# Patient Record
Sex: MALE | Race: WHITE | NOT HISPANIC OR LATINO | Employment: OTHER | URBAN - METROPOLITAN AREA
[De-identification: names, ages, dates, MRNs, and addresses within clinical notes are randomized per-mention and may not be internally consistent; named-entity substitution may affect disease eponyms.]

---

## 2018-09-14 ENCOUNTER — HISTORICAL (OUTPATIENT)
Dept: ADMINISTRATIVE | Facility: HOSPITAL | Age: 73
End: 2018-09-14

## 2018-09-14 LAB
ABS NEUT (OLG): 0.58 X10(3)/MCL (ref 2.1–9.2)
CRP SERPL HS-MCNC: 7.66 MG/L (ref 0–3)
ERYTHROCYTE [DISTWIDTH] IN BLOOD BY AUTOMATED COUNT: 14.9 % (ref 11.5–17)
ERYTHROCYTE [SEDIMENTATION RATE] IN BLOOD: 38 MM/HR (ref 0–15)
HCT VFR BLD AUTO: 33.8 % (ref 42–52)
HGB BLD-MCNC: 11.3 GM/DL (ref 14–18)
MCH RBC QN AUTO: 34.5 PG (ref 27–31)
MCHC RBC AUTO-ENTMCNC: 33.4 GM/DL (ref 33–36)
MCV RBC AUTO: 103 FL (ref 80–94)
PLATELET # BLD AUTO: 148 X10(3)/MCL (ref 130–400)
PMV BLD AUTO: 10.5 FL (ref 9.4–12.4)
RBC # BLD AUTO: 3.28 X10(6)/MCL (ref 4.7–6.1)
WBC # SPEC AUTO: 2 X10(3)/MCL (ref 4.5–11.5)

## 2018-10-12 ENCOUNTER — INITIAL CONSULT (OUTPATIENT)
Dept: OPHTHALMOLOGY | Facility: CLINIC | Age: 73
End: 2018-10-12
Payer: MEDICARE

## 2018-10-12 ENCOUNTER — CLINICAL SUPPORT (OUTPATIENT)
Dept: OPHTHALMOLOGY | Facility: CLINIC | Age: 73
End: 2018-10-12
Payer: MEDICARE

## 2018-10-12 DIAGNOSIS — H47.11 PAPILLEDEMA ASSOCIATED WITH INCREASED INTRACRANIAL PRESSURE: ICD-10-CM

## 2018-10-12 DIAGNOSIS — H53.433 ARCUATE SCOTOMA OF BOTH EYES: ICD-10-CM

## 2018-10-12 PROCEDURE — 92083 EXTENDED VISUAL FIELD XM: CPT | Mod: PBBFAC | Performed by: OPHTHALMOLOGY

## 2018-10-12 PROCEDURE — 99999 PR PBB SHADOW E&M-NEW PATIENT-LVL III: CPT | Mod: PBBFAC,,, | Performed by: OPHTHALMOLOGY

## 2018-10-12 PROCEDURE — 92004 COMPRE OPH EXAM NEW PT 1/>: CPT | Mod: S$PBB,,, | Performed by: OPHTHALMOLOGY

## 2018-10-12 PROCEDURE — 99203 OFFICE O/P NEW LOW 30 MIN: CPT | Mod: PBBFAC,25 | Performed by: OPHTHALMOLOGY

## 2018-10-12 RX ORDER — TEMAZEPAM 15 MG/1
CAPSULE ORAL
Refills: 0 | COMMUNITY
Start: 2018-09-24

## 2018-10-12 RX ORDER — FLUCONAZOLE 200 MG/1
TABLET ORAL
COMMUNITY
Start: 2018-09-26

## 2018-10-12 RX ORDER — ASPIRIN 81 MG/1
81 TABLET ORAL DAILY
COMMUNITY

## 2018-10-12 RX ORDER — BENAZEPRIL HYDROCHLORIDE 20 MG/1
TABLET ORAL
COMMUNITY
Start: 2018-07-13

## 2018-10-12 RX ORDER — LENALIDOMIDE 25 MG/1
CAPSULE ORAL
COMMUNITY
Start: 2018-08-14

## 2018-10-12 RX ORDER — ACETAZOLAMIDE 500 MG/1
500 CAPSULE, EXTENDED RELEASE ORAL 2 TIMES DAILY
Qty: 60 CAPSULE | Refills: 5 | Status: SHIPPED | OUTPATIENT
Start: 2018-10-12

## 2018-10-12 RX ORDER — AMLODIPINE BESYLATE 5 MG/1
TABLET ORAL
COMMUNITY
Start: 2018-07-13

## 2018-10-12 RX ORDER — VALACYCLOVIR HYDROCHLORIDE 500 MG/1
TABLET, FILM COATED ORAL
COMMUNITY
Start: 2018-09-26

## 2018-10-12 RX ORDER — SULFAMETHOXAZOLE AND TRIMETHOPRIM 800; 160 MG/1; MG/1
TABLET ORAL
COMMUNITY
Start: 2018-09-20

## 2018-10-12 NOTE — PROGRESS NOTES
HPI     Referred by Dr.Jeffrey ANDREA Quiñonez  Patient states OU seeing flashing lights in vision since finishing chemo x   4 weeks.  Dx w/multiple myeloma.  Pt notice has gotten better, but still there. Notice more when images or   farther away the bigger the lights.    No eye pain.    I have personally interviewed the patient, reviewed the history and   examined the patient and agree with the technician's exam.    I reviewed his MRI and agree that there were no mass lesions.    Last edited by Natan Lloyd MD on 10/12/2018  2:04 PM. (History)            Assessment /Plan     For exam results, see Encounter Report.    Papilledema associated with increased intracranial pressure  -     Godinez Visual Field - OU - Extended - Both Eyes    Arcuate scotoma of both eyes    Other orders  -     acetaZOLAMIDE (DIAMOX) 500 mg CpSR; Take 1 capsule (500 mg total) by mouth 2 (two) times daily.  Dispense: 60 capsule; Refill: 5      The lack of headaches, the normal temporal arteries, the low platelet count, and normal for age ESR make giant cell arteritis unlikely. Possible causes of papilledema in Mr. Puentes would include chronic anemia, neoplastic meningitis, and a CNS infection. The normal MRI rules out a mass lesion. I would recommend a lumbar puncture to measure the intracranial pressure and assess the CSF for inflammation or neoplasm. He would likely benefit with improvement in his hematocrit. I will start acetazolamide 500 mg per day to see if that helps with his vision. I will send this information to his oncologist, Dr. Mujica, to set up the lumbar puncture. He will need repeat visual field testing in one month; if Dr. Quiñonez can perform the visual field test in his office and send me copies of that along with photos of the optic discs, he can do his eye follow up with Dr. Quiñonez. If not, I will set that up here.

## 2018-10-12 NOTE — PATIENT INSTRUCTIONS
Take the capsule twice a day.  Lumbar puncture through Dr. Mujica.  Follow up with Dr. Quiñonez if possible in one month for optic disc photos and visual field testing to forward to me. Set up here if Dr. Quiñonez cannot provide the tests.

## 2018-10-12 NOTE — LETTER
[facility]  [address]  [facility] [address]  Phone: [phone]  Fax: [phone]   October 12, 2018    Cameron Quiñonez MD  [address] [place] [address]    Patient: Jamaal Puentes   MR Number: [id number]   YOB: 1945   Date of Visit: 10/12/2018       Dear Dr. Quiñonez:    Thank you for referring Jamaal Puentes to me for evaluation. Here is my assessment and plan of care:    Assessment:   /Plan     For exam results, see Encounter Report.    Papilledema associated with increased intracranial pressure  -     Godinez Visual Field - OU - Extended - Both Eyes    Arcuate scotoma of both eyes    Other orders  -     acetaZOLAMIDE (DIAMOX) 500 mg CpSR; Take 1 capsule (500 mg total) by mouth 2 (two) times daily.  Dispense: 60 capsule; Refill: 5      The lack of headaches, the normal temporal arteries, the low platelet count, and normal for age ESR make giant cell arteritis unlikely. Possible causes of papilledema in Mr. Puentes would include chronic anemia, neoplastic meningitis, and a CNS infection. The normal MRI rules out a mass lesion. I would recommend a lumbar puncture to measure the intracranial pressure and assess the CSF for inflammation or neoplasm. He would likely benefit with improvement in his hematocrit. I will start acetazolamide 500 mg per day to see if that helps with his vision. I will send this information to his oncologist, Dr. Mujica, to set up the lumbar puncture. He will need repeat visual field testing in one month; if Dr. Quiñonez can perform the visual field test in his office and send me copies of that along with photos of the optic discs, he can do his eye follow up with Dr. Quiñonez. If not, I will set that up here.          Plan:       For exam results, see Encounter Report.    Papilledema associated with increased intracranial pressure  -     Godinez Visual Field - OU - Extended - Both Eyes    Arcuate scotoma of both eyes    Other orders  -      acetaZOLAMIDE (DIAMOX) 500 mg CpSR; Take 1 capsule (500 mg total) by mouth 2 (two) times daily.  Dispense: 60 capsule; Refill: 5      The lack of headaches, the normal temporal arteries, the low platelet count, and normal for age ESR make giant cell arteritis unlikely. Possible causes of papilledema in Mr. Puentes would include chronic anemia, neoplastic meningitis, and a CNS infection. The normal MRI rules out a mass lesion. I would recommend a lumbar puncture to measure the intracranial pressure and assess the CSF for inflammation or neoplasm. He would likely benefit with improvement in his hematocrit. I will start acetazolamide 500 mg per day to see if that helps with his vision. I will send this information to his oncologist, Dr. Mujica, to set up the lumbar puncture. He will need repeat visual field testing in one month; if Dr. Quiñonez can perform the visual field test in his office and send me copies of that along with photos of the optic discs, he can do his eye follow up with Dr. Quiñonez. If not, I will set that up here.            Below you will find my full exam findings. If you have questions, please do not hesitate to call me. I look forward to following Mr. Jamaal Puentes along with you.    Sincerely,          Natan Lloyd MD       CC  Gustavo Mujica MD             Base Eye Exam     Visual Acuity (Snellen - Linear)       Right Left    Dist sc 20/40 -2 20/60    Dist ph sc NI 20/40 -1          Tonometry (Applanation, 2:09 PM)       Right Left    Pressure 13 13          Pupils       Dark Light Shape React APD    Right 3 2 Round Brisk None    Left 3 2 Round Brisk None          Visual Fields    See HVF report.           Extraocular Movement       Right Left     Full, Ortho Full, Ortho          Neuro/Psych     Oriented x3:  Yes    Mood/Affect:  Normal          Dilation     Both eyes:  2.5% Phenylephrine, 1% Mydriacyl @ 2:42 PM            Slit Lamp and Fundus Exam     External Exam       Right Left     External Normal temporal artery pulse Normal temporal artery pulse          Slit Lamp Exam       Right Left    Lids/Lashes Normal Normal    Conjunctiva/Sclera White and quiet White and quiet    Cornea Clear Clear    Anterior Chamber Deep and quiet Deep and quiet    Iris Round and reactive Round and reactive    Lens 2+ Nuclear sclerosis 2+ Nuclear sclerosis    Vitreous  Normal          Fundus Exam       Right Left    Disc 3+ Optic disc edema 3+ Optic disc edema    C/D Ratio 0.1 0.1    Macula Normal Normal    Vessels Tortuous Tortuous    Periphery Normal Normal

## 2018-11-05 ENCOUNTER — TELEPHONE (OUTPATIENT)
Dept: OPHTHALMOLOGY | Facility: CLINIC | Age: 73
End: 2018-11-05

## 2018-11-05 DIAGNOSIS — H53.433 ARCUATE SCOTOMA OF BOTH EYES: ICD-10-CM

## 2018-11-05 DIAGNOSIS — H47.11 PAPILLEDEMA ASSOCIATED WITH INCREASED INTRACRANIAL PRESSURE: Primary | ICD-10-CM

## 2018-11-05 NOTE — TELEPHONE ENCOUNTER
----- Message from Sharmaine Marcos sent at 11/5/2018 12:50 PM CST -----  Contact: Kendra (spouse)  Patient Returning Call from [facility]    Who Left Message for Patient: Dr. Lloyd    Communication Preference: Call back [phone]    Additional Information: Pt spouse returning your phone call with regard to her husbands results from the lumbar puncture.  She apologized for missing your call today but would like for you to call her back when possible.

## 2018-11-05 NOTE — TELEPHONE ENCOUNTER
Attempted to call Mr. Puentes's wife regarding lumbar puncture. Went to voicemail. I left a message for her to return my call.

## 2018-11-05 NOTE — TELEPHONE ENCOUNTER
I spoke with Ms. Sanchez. I explained the cytospin procedure and indicated that no malignant cells had been seen on microscopic examination. Ms. Sanchez indicated that she would contact Dr. Mujica's office to assure that I received the full results of the recent LP. Mr. Puentes will be seeing an ophthalmologist in [place] in a few weeks and she will have him send me the results of the eye exam and visual field testing.

## 2018-11-05 NOTE — TELEPHONE ENCOUNTER
----- Message from Geena Gillespie MA sent at 10/31/2018  4:06 PM CDT -----  Contact: Kendra (wife)       ----- Message -----  From: Terri Rodriguez  Sent: 10/31/2018   3:57 PM  To: [facility] Staff    Pt wife wanted to let the office know that the pt spinal tap was done on Monday. Pt wife can be reached at [phone].

## 2018-11-07 ENCOUNTER — TELEPHONE (OUTPATIENT)
Dept: OPHTHALMOLOGY | Facility: CLINIC | Age: 73
End: 2018-11-07

## 2018-11-16 ENCOUNTER — TELEPHONE (OUTPATIENT)
Dept: OPHTHALMOLOGY | Facility: CLINIC | Age: 73
End: 2018-11-16

## 2018-11-16 NOTE — TELEPHONE ENCOUNTER
----- Message from Terri Rodriguez sent at 11/16/2018 10:44 AM CST -----  Contact: Suri ([facility])  Suri from [facility] was calling to get pt office notes sent over to their office. Suri can be reached at [phone]. [facility] Fax [phone].

## 2018-12-04 ENCOUNTER — TELEPHONE (OUTPATIENT)
Dept: OPHTHALMOLOGY | Facility: CLINIC | Age: 73
End: 2018-12-04

## 2018-12-04 DIAGNOSIS — H53.433 ARCUATE SCOTOMA OF BOTH EYES: ICD-10-CM

## 2018-12-04 DIAGNOSIS — H47.11 PAPILLEDEMA ASSOCIATED WITH INCREASED INTRACRANIAL PRESSURE: Primary | ICD-10-CM

## 2018-12-04 NOTE — TELEPHONE ENCOUNTER
I reviewed his HVF from November. He shows evidence of improvement of his peripheral vision. Examination at the [facility] in [place] showed decreased optic disc edema. I spoke with Kendra, his wife, regarding maintaining acetazolamide dosage at 500 mg daily rather than 1000 mg daily. I agreed that it would be safe for him to take omega 3 fatty acids to help keep his eyes moist. He should have a repeat visual field test in about a month. She indicated understanding.

## 2018-12-17 ENCOUNTER — TELEPHONE (OUTPATIENT)
Dept: OPHTHALMOLOGY | Facility: CLINIC | Age: 73
End: 2018-12-17

## 2018-12-17 NOTE — TELEPHONE ENCOUNTER
----- Message from Sharmaine Hemant sent at 12/17/2018 10:25 AM CST -----  Contact: Kendra (wife)  Pt wife calling with regard to pt having to have VF test done every month per Dr. Lloyd.  Review of his chart did not confirm he wanted the test done every month just that he was to have it done in one month and the information was to be forwarded to him.  She states she spoke to him a couple of weeks ago and he stated it needed to be done monthly.  There is no order for it.  Please contact her at [phone].

## 2019-12-31 ENCOUNTER — HISTORICAL (OUTPATIENT)
Dept: ADMINISTRATIVE | Facility: HOSPITAL | Age: 74
End: 2019-12-31

## 2020-01-20 ENCOUNTER — HISTORICAL (OUTPATIENT)
Dept: ADMINISTRATIVE | Facility: HOSPITAL | Age: 75
End: 2020-01-20

## 2020-02-06 ENCOUNTER — HISTORICAL (OUTPATIENT)
Dept: ADMINISTRATIVE | Facility: HOSPITAL | Age: 75
End: 2020-02-06

## 2020-03-12 ENCOUNTER — HISTORICAL (OUTPATIENT)
Dept: ADMINISTRATIVE | Facility: HOSPITAL | Age: 75
End: 2020-03-12

## 2021-09-28 ENCOUNTER — HISTORICAL (OUTPATIENT)
Dept: ADMINISTRATIVE | Facility: HOSPITAL | Age: 76
End: 2021-09-28

## 2021-09-28 LAB — SARS-COV-2 RNA RESP QL NAA+PROBE: NOT DETECTED

## 2022-04-10 ENCOUNTER — HISTORICAL (OUTPATIENT)
Dept: ADMINISTRATIVE | Facility: HOSPITAL | Age: 77
End: 2022-04-10
Payer: MEDICARE

## 2022-04-28 VITALS
SYSTOLIC BLOOD PRESSURE: 147 MMHG | WEIGHT: 172 LBS | DIASTOLIC BLOOD PRESSURE: 74 MMHG | BODY MASS INDEX: 25.48 KG/M2 | HEIGHT: 69 IN | OXYGEN SATURATION: 97 %

## 2022-05-03 NOTE — HISTORICAL [facility] CERNER
This is a historical note converted from Joshua. Formatting and pictures may have been removed.  Please reference Joshua for original formatting and attached multimedia. Procedure Name  Bilateral L2-5 medial branch blocks  ?   Pre-Procedure Diagnoses:  1. Chronic pain syndrome  2. Low back pain  3. Lumbar facet arthropathy  ?   Post-Procedure Diagnoses:  1. Chronic pain syndrome  2. Low back pain  3. Lumbar facet arthropathy  ?   Anesthesia:  Local and MAC  ?  Estimated Blood Loss:  None  ?  Complications:  None  ?  Informed Consent:  The procedure, risks, benefits, and alternatives were discussed with the patient.? There were no contraindications to the procedure.? The patient expressed understanding and agreed to proceed.? Fully informed written consent was obtained.?  ?  Description of the Procedure:  The patient was taken to the operating room.? IV access was obtained prior to the start of the procedure.? The patient was positioned prone on the fluoroscopy table.? Continuous hemodynamic monitoring was initiated and continued throughout the duration of the procedure.? The skin overlying the lumbosacral spine was prepped with Betadine and draped into a sterile field.? Fluoroscopy was used to identify the locations of the L2, L3, L4, and L5 medial branch nerves at the junctions of the superior articular processes and transverse processes of L3, L4, L5, and the sacral ala, respectively.? Skin anesthesia was achieved using?0.5 mL of 1% lidocaine over each injection site.? A 22-gauge 3.5-inch Quinke spinal needle was slowly inserted and advanced under intermittent fluoroscopic guidance until it made bony contact at the target sites.? Proper needle position was confirmed under AP, oblique, and lateral fluoroscopic views.? Negative aspiration for blood or CSF was confirmed.? Then a combination of?5 mg of Kenalog and?0.5 mL of 0.5% bupivacaine was easily injected at each level.? There was no pain on injection.? The  needles were removed intact and bleeding was nil.? The same procedure was repeated in identical fashion on the?right side. Sterile bandages were applied.? The patient was taken to the recovery room for further observation in stable condition.? The patient was then discharged home without any complications.

## 2022-05-03 NOTE — HISTORICAL [facility] CERNER
This is a historical note converted from Joshua. Formatting and pictures may have been removed.  Please reference Joshua for original formatting and attached multimedia. Chief Complaint  pt here for right foot pain/numbness x 3 months, states pain is under the heel of the foot on the left side, states feels like walking on rocks, saw Dr. Franklin in [place], also c/o lower back pain, xrays today.....sm  History of Present Illness  Patient complains of right foot pain.  He reports if he sits for long period time and goes to get up he does have some pain over the?bottom to the medial aspect of his right heel. ?He denies any trauma?incident or other injury.  ?  Patient is reported?does have a history of multiple myeloma?and is also been having some back pain he did have a?CT or MRI done in our [place] in [place] but he does not have the images of the results?with him today. ?New line?he was told by the oncologist that are the test that he does have some?back issues with pinching of the nerves and need to see someone who took care of back problems  Review of Systems  Constitutional: No fever, No chills.  Respiratory: No shortness of breath, No cough.  Cardiovascular: No chest pain.  Gastrointestinal: No nausea, No vomiting, No diarrhea, No constipation, No heartburn.  Genitourinary: No dysuria, No hematuria.  Hematology/Lymphatics: No bleeding tendency.  Endocrine: No polyuria.  Neurologic: Alert and oriented X4, No numbness, No tingling.  Psychiatric: No anxiety, No depression.  Integumentary:? negative except as documented in history of present illness  Physical Exam  Vitals & Measurements  T:?98.1? ?F (Oral)? HR:?74(Peripheral)? BP:?124/74?  HT:?172?cm? WT:?74?kg? BMI:?25.01?  Right foot exam  Patient has good strength with dorsi and plantarflexion  Extensor hallucis?strength intact  2+ dorsalis pedis pulse of the right foot  Intact motor and sensation  Patient does have significant tenderness palpation  across the medial facet of the?right?heel  Tender to palpation across the plantar fascial origin  No edema or erythema noted  ?  X-rays taken today of the right foot  No acute bony abnormalities noted  There is a small osteophyte off the?calcaneus  ?  Diagnosis: Right foot plantar fasciitis  Plan:?Patient is educated informed that he is to wear soft shoes at all time.  He is given a handout on plantar fascial stretching and instructed on using a frozen water bottle for icing of the right heel.  ?  Patient does have issues with his lower lumbar spine and an appointment is made with Dr. Prieto to see about his lower lumbar issues.  Patient also instructed needs to obtain copies of his imaging studies that he had done in [place] is to bring to his appointment with Dr. Prieto can better evaluate him  Assessment/Plan  1.?Plantar fasciitis, right?M72.2   Problem List/Past Medical History  Ongoing  FH: Hypertension  Hyperlipemia  Myeloma  Plantar fasciitis, right  Tobacco user  Historical  No qualifying data  Procedure/Surgical History  Rotator cuff repair (2015)  Appendectomy (1959)  Repair of Intestinal Blockage (1959)   Medications  amLODIPine 5 mg oral tablet, 5 mg= 1 tab(s), Oral, Daily  benazepril 20 mg oral tablet, 20 mg= 1 tab(s), Oral, Daily  Pravastatin 40 mg Oral Tab, 40 mg= 1 tab(s), Oral, Daily,? ?Not taking: Last Dose Date/Time Unknown. done  valacyclovir 500 mg oral tablet, 500 mg= 1 tab(s), Oral, Daily  Ventolin HFA 90 mcg/inh inhalation aerosol, 2 puff(s), INH, q4hr, PRN  Allergies  sulfa drugs  Social History  Tobacco  Current some day smoker, 20 per day., 10/04/2016  Family History  Alzheimers disease: Mother.  HTN (hypertension) 27-APR-2016 23:35:49<$>: Mother.  Idiopathic pulmonary fibrosis: Father.  Health Maintenance  Health Maintenance  ???Pending?(in the next year)  ??? ??Due?  ??? ? ? ?ADL Screening due??12/31/19??and every 1??year(s)  ??? ? ? ?Abdominal Aortic Aneurysm Screening  due??12/31/19??and every 100??year(s)  ??? ? ? ?Aspirin Therapy for CVD Prevention due??12/31/19??and every 1??year(s)  ??? ? ? ?Lung Cancer Screening due??12/31/19??and every 1??year(s)  ??? ? ? ?Pneumococcal Vaccine due??12/31/19??Variable frequency  ??? ? ? ?Tetanus Vaccine due??12/31/19??and every 10??year(s)  ??? ? ? ?Zoster Vaccine due??12/31/19??and every 100??year(s)  ??? ??Due In Future?  ??? ? ? ?Advance Directive not due until??01/01/20??and every 1??year(s)  ??? ? ? ?Alcohol Misuse Screening not due until??01/01/20??and every 1??year(s)  ??? ? ? ?Cognitive Screening not due until??01/01/20??and every 1??year(s)  ??? ? ? ?Fall Risk Assessment not due until??01/01/20??and every 1??year(s)  ??? ? ? ?Functional Assessment not due until??01/01/20??and every 1??year(s)  ??? ? ? ?Geriatric Depression Screening not due until??01/01/20??and every 1??year(s)  ??? ? ? ?Obesity Screening not due until??01/01/20??and every 1??year(s)  ??? ? ? ?Smoking Cessation not due until??01/01/20??and every 1??year(s)  ???Satisfied?(in the past 1 year)  ??? ??Satisfied?  ??? ? ? ?Blood Pressure Screening on??12/31/19.??Satisfied by Colleen Butterfield LPN  ??? ? ? ?Body Mass Index Check on??12/31/19.??Satisfied by Colleen Butterfield LPN  ??? ? ? ?Hypertension Management-Blood Pressure on??12/31/19.??Satisfied by Colleen Butterfield LPN  ??? ? ? ?Obesity Screening on??12/31/19.??Satisfied by Colleen Butterfield LPN  ?

## 2022-05-03 NOTE — HISTORICAL [facility] CERNER
This is a historical note converted from Joshua. Formatting and pictures may have been removed.  Please reference Joshua for original formatting and attached multimedia. Procedure Name  Bilateral L4?Transforaminal Epidural Steroid Injections  ?   Pre-Procedure Diagnoses:  1. Chronic pain syndrome  2. Low back pain  3. Lumbar radiculopathy  4. Lumbar disc displacement  5. Lumbar degenerative disc disease  ?   Post-Procedure Diagnoses:  1. Chronic pain syndrome  2. Low back pain  3. Lumbar radiculopathy  4. Lumbar disc displacement  5. Lumbar degenerative disc disease  ?   Anesthesia:  Local and MAC  ?  Estimated Blood Loss:  None  ?  Complications:  None  ?  Informed Consent:  The procedure, risks, benefits, and alternatives were discussed with the patient.? There were no contraindications to the procedure.? The patient expressed understanding and agreed to proceed.? Fully informed written consent was obtained.?  ?  Description of the Procedure:  The patient was taken to the operating room.? IV access was obtained prior to the start of the procedure.? The patient was positioned prone on the fluoroscopy table.? Continuous hemodynamic monitoring was initiated and continued throughout the duration of the procedure.? The skin overlying the lumbosacral spine was prepped with Betadine and draped into a sterile field.? An oblique fluoroscopic view was obtained on the left side at?L4, with the superior articular process of the inferior vertebral body aligned with the pedicle.? Skin anesthesia was achieved using 1 mL of 1% lidocaine.? A 22-gauge 3.5-inch Quinke spinal needle was slowly inserted and advanced towards the 6 oclock position of the pedicle and into the epidural space.? Proper needle position was confirmed under AP, oblique, and lateral fluoroscopic views.? Negative aspiration for blood or CSF was confirmed.? 0.5 mL of Isovue contrast was injected.? Fluoroscopic imaging revealed a clear outline of the spinal nerve  with proximal spread of agent through the neural foramen and into the epidural space.? Then a combination of 5 mg of dexamethasone and 1 mL of 0.5% bupivacaine was easily injected.? There was no pain on injection.? The needle was removed intact and bleeding was nil.? The same procedure was repeated in identical fashion on the?right side at L4. Sterile bandages were applied.? The patient was taken to the recovery room for further observation in stable condition.? The patient was then discharged home without any complications.

## 2022-05-03 NOTE — HISTORICAL [facility] CERNER
This is a historical note converted from [employer]. Formatting and pictures may have been removed.  Please reference Joshua for original formatting and attached multimedia. Chief Complaint  LOW BACK PAIN REFERRED BY ANDREA AND DR. HOWE. ?HE STATES IT HURTS WHEN HE BENDS DOWN TO  LIMBS, HE HAD SOME NUMBNESS IN HIS LEGS.  History of Present Illness  He is complaining of numbness in both lower extremities. ?He has a pertinent history in that he has been treated with?chemotherapy for multiple myeloma.? The numbness in his legs started sometimes about the treatment he remembers no?trauma?and has no real back pain. ?Is just his constant numbness or tingling in both feet?and both lower extremities. ?He has no bowel or bladder problems. ?He has no weakness in his lower extremities.? Just the history of the myeloma.? This is been going on for some time.? He did have an MRI performed?by his?current treating physician and was referred here.? He was seen by his cardiologist and?vascular status was cleared in his lower extremities.  Review of Systems  Comprehensive Review of Systems performed with no exceptions other than as noted in HPI.  ?  ?  Physical Exam  Vitals & Measurements  T:?97.2? ?F (Oral)? BP:?128/72?  HT:?172?cm? WT:?74?kg? BMI:?23.62?  Examination of the lumbar spine. ?He has no tenderness to palpation over the lumbar spine. ?He has no?pelvic brim tenderness no sciatic notch tenderness. ?He flexes fingertips to below his knees.? He has full extension. ?Lateral bending to the left and the right causes pain.? He can walk on his heels and toes. ?Knee jerks and ankle jerks are +2 and symmetrical. ?Babinski is normal. ?There is no clonus. ?Sitting straight leg raising is negative. ?Supine straight leg raising is negative. ?Polo test is negative. ?Sensation?is decreased to light touch?below the knees in both lower extremities.  ?  Examination of the abdomen there are no.  Assessment/Plan  1.?Herniated lumbar  intervertebral disc?M51.26  ?I reviewed his MRI with him.? I discussed the findings. ?I feel like it he will eventually need a discectomy. ?But we will try lumbar epidural steroid injections. ?I answered all of his questions to the best. ?I will make him a referral?for [facility]?and see him back as needed.  ?  Components of this note were documented using voice recognition systems and are subject to errors not corrected at proof reading. ?Please contact the author for any clarifications.  Ordered:  Office/Outpatient Visit Level 4 Established [id number] PC, Herniated lumbar intervertebral disc  Radiculopathy, lumbar region  Acute low back pain, [facility], 01/20/20 10:41:00 CST  ?  2.?Radiculopathy, lumbar region?M54.16  Ordered:  Office/Outpatient Visit Level 4 Established [id number] PC, Herniated lumbar intervertebral disc  Radiculopathy, lumbar region  Acute low back pain, [facility], 01/20/20 10:41:00 CST  ?  Orders:  Clinic Follow-up PRN, 01/20/20 10:40:00 CST, Future Order, [facility]  XR Pelvis 1 or 2 Views, Routine, 01/20/20 10:05:00 CST, Pain, None, Ambulatory, Rad Type, Acute low back pain, Not Scheduled, 01/20/20 10:05:00 CST  XR Spine Lumbar 2 or 3 Views, Routine, 01/20/20 10:05:00 CST, Back Pain, None, Ambulatory, Rad Type, Acute low back pain, Not Scheduled, 01/20/20 10:05:00 CST  Referrals  Ambulatory Referral, Specialty: Pain Management, Reason: l4/5 HNP, Refer To: Dia BOWMAN, Trina DYKES, [facility], [address], [place] [address]., Start: 01/20/20 10:42:00 CST, Herniated lumbar intervertebral disc  Radiculopathy, lumbar region...  Clinic Follow-up PRN, 01/20/20 10:40:00 CST, Future Order, [facility]   Problem List/Past Medical History  Ongoing  FH: Hypertension  Herniated lumbar intervertebral disc  Hyperlipemia  Myeloma  Plantar fasciitis, right  Radiculopathy, lumbar region  Tobacco user  Historical  No qualifying data  Procedure/Surgical History  Rotator cuff  repair (2015)  Appendectomy (1959)  Repair of Intestinal Blockage (1959)   Medications  amLODIPine 5 mg oral tablet, 5 mg= 1 tab(s), Oral, Daily  benazepril 20 mg oral tablet, 20 mg= 1 tab(s), Oral, Daily  Pravastatin 40 mg Oral Tab, 40 mg= 1 tab(s), Oral, Daily,? ?Not taking: Last Dose Date/Time Unknown. done  valacyclovir 500 mg oral tablet, 500 mg= 1 tab(s), Oral, Daily  Ventolin HFA 90 mcg/inh inhalation aerosol, 2 puff(s), INH, q4hr, PRN  Allergies  sulfa drugs  Social History  Abuse/Neglect  No, No, Yes, 01/20/2020  Alcohol  Current, Liquor, 1-2 times per month, Alcohol use interferes with work or home: No. Others hurt by drinking: No. Household alcohol concerns: No., 01/20/2020  Employment/School  Unemployed, 01/20/2020  Home/Environment  Lives with Spouse., 01/20/2020  Tobacco  Current some day smoker, 20 per day., 10/04/2016  Family History  Alzheimers disease: Mother.  HTN (hypertension) 27-APR-2016 23:35:49<$>: Mother.  Idiopathic pulmonary fibrosis: Father.  Health Maintenance  Health Maintenance  ???Pending?(in the next year)  ??? ??OverDue  ??? ? ? ?Advance Directive due??01/01/20??and every 1??year(s)  ??? ? ? ?Geriatric Depression Screening due??01/01/20??and every 1??year(s)  ??? ? ? ?Smoking Cessation due??01/01/20??and every 1??year(s)  ??? ??Due?  ??? ? ? ?Alcohol Misuse Screening due??01/01/20??and every 1??year(s)  ??? ? ? ?Cognitive Screening due??01/01/20??and every 1??year(s)  ??? ? ? ?ADL Screening due??01/20/20??and every 1??year(s)  ??? ? ? ?Abdominal Aortic Aneurysm Screening due??01/20/20??and every 100??year(s)  ??? ? ? ?Aspirin Therapy for CVD Prevention due??01/20/20??and every 1??year(s)  ??? ? ? ?Colorectal Screening ([facility]) due??01/20/20??and every?  ??? ? ? ?Lung Cancer Screening due??01/20/20??and every 1??year(s)  ??? ? ? ?Pneumococcal Vaccine due??01/20/20??Variable frequency  ??? ? ? ?Pneumococcal Vaccine due??01/20/20??and every?  ??? ? ? ?Tetanus Vaccine  due??01/20/20??and every 10??year(s)  ??? ? ? ?Zoster Vaccine due??01/20/20??and every 100??year(s)  ??? ??Due In Future?  ??? ? ? ?Fall Risk Assessment not due until??01/01/21??and every 1??year(s)  ??? ? ? ?Functional Assessment not due until??01/01/21??and every 1??year(s)  ??? ? ? ?Obesity Screening not due until??01/01/21??and every 1??year(s)  ???Satisfied?(in the past 1 year)  ??? ??Satisfied?  ??? ? ? ?Blood Pressure Screening on??01/20/20.??Satisfied by Juan Hatch  ??? ? ? ?Body Mass Index Check on??01/20/20.??Satisfied by Juan Hatch  ??? ? ? ?Fall Risk Assessment on??01/20/20.??Satisfied by Juan Hatch  ??? ? ? ?Functional Assessment on??01/20/20.??Satisfied by Juan Hatch  ??? ? ? ?Hypertension Management-Blood Pressure on??01/20/20.??Satisfied by Juan Hatch  ??? ? ? ?Obesity Screening on??01/20/20.??Satisfied by Juan Hatch  ?  Diagnostic Results  AP of the pelvis was normal.  ?  3 views of the lumbar spine there is only mild?changes.  ?  MRI of the lumbar spine?there is a large?central disc herniation at L4-5 that causes severe central and bilateral foraminal